# Patient Record
Sex: FEMALE | Race: WHITE | Employment: FULL TIME | URBAN - METROPOLITAN AREA
[De-identification: names, ages, dates, MRNs, and addresses within clinical notes are randomized per-mention and may not be internally consistent; named-entity substitution may affect disease eponyms.]

---

## 2017-05-26 ENCOUNTER — OFFICE VISIT (OUTPATIENT)
Dept: FAMILY MEDICINE CLINIC | Age: 37
End: 2017-05-26

## 2017-05-26 VITALS
DIASTOLIC BLOOD PRESSURE: 83 MMHG | SYSTOLIC BLOOD PRESSURE: 112 MMHG | HEART RATE: 82 BPM | TEMPERATURE: 98.6 F | WEIGHT: 254.8 LBS

## 2017-05-26 DIAGNOSIS — Z13.220 SCREENING FOR LIPID DISORDERS: ICD-10-CM

## 2017-05-26 DIAGNOSIS — E03.9 ACQUIRED HYPOTHYROIDISM: ICD-10-CM

## 2017-05-26 DIAGNOSIS — Z13.1 SCREENING FOR DIABETES MELLITUS: ICD-10-CM

## 2017-05-26 DIAGNOSIS — Z11.4 SCREENING FOR HIV WITHOUT PRESENCE OF RISK FACTORS: ICD-10-CM

## 2017-05-26 DIAGNOSIS — Z00.00 ANNUAL PHYSICAL EXAM: Primary | ICD-10-CM

## 2017-05-26 LAB
ALBUMIN SERPL-MCNC: 4.1 G/DL (ref 3.9–4.9)
ALP BLD-CCNC: 90 U/L (ref 40–130)
ALT SERPL-CCNC: 34 U/L (ref 0–33)
ANION GAP SERPL CALCULATED.3IONS-SCNC: 16 MEQ/L (ref 7–13)
AST SERPL-CCNC: 30 U/L (ref 0–35)
BILIRUB SERPL-MCNC: 0.4 MG/DL (ref 0–1.2)
BUN BLDV-MCNC: 14 MG/DL (ref 6–20)
CALCIUM SERPL-MCNC: 9.2 MG/DL (ref 8.6–10.2)
CHLORIDE BLD-SCNC: 102 MEQ/L (ref 98–107)
CHOLESTEROL, TOTAL: 204 MG/DL (ref 0–199)
CO2: 22 MEQ/L (ref 22–29)
CREAT SERPL-MCNC: 0.91 MG/DL (ref 0.5–0.9)
GFR AFRICAN AMERICAN: >60
GFR NON-AFRICAN AMERICAN: >60
GLOBULIN: 3.2 G/DL (ref 2.3–3.5)
GLUCOSE BLD-MCNC: 87 MG/DL (ref 74–109)
HDLC SERPL-MCNC: 36 MG/DL (ref 40–59)
LDL CHOLESTEROL CALCULATED: 140 MG/DL (ref 0–129)
POTASSIUM SERPL-SCNC: 4.3 MEQ/L (ref 3.5–5.1)
SODIUM BLD-SCNC: 140 MEQ/L (ref 132–144)
TOTAL PROTEIN: 7.3 G/DL (ref 6.4–8.1)
TRIGL SERPL-MCNC: 141 MG/DL (ref 0–200)
TSH REFLEX: 1.14 UIU/ML (ref 0.27–4.2)

## 2017-05-26 PROCEDURE — 99385 PREV VISIT NEW AGE 18-39: CPT | Performed by: NURSE PRACTITIONER

## 2017-05-26 RX ORDER — LEVOTHYROXINE SODIUM 0.12 MG/1
TABLET ORAL
COMMUNITY
Start: 2017-05-01 | End: 2017-05-26 | Stop reason: SDUPTHER

## 2017-05-26 RX ORDER — LEVOTHYROXINE SODIUM 0.12 MG/1
125 TABLET ORAL DAILY
Qty: 30 TABLET | Refills: 1 | Status: SHIPPED | OUTPATIENT
Start: 2017-05-26 | End: 2017-08-04 | Stop reason: SDUPTHER

## 2017-05-26 ASSESSMENT — ENCOUNTER SYMPTOMS
TROUBLE SWALLOWING: 0
RECTAL PAIN: 0
ABDOMINAL PAIN: 0
GASTROINTESTINAL NEGATIVE: 1
EYES NEGATIVE: 1
DIARRHEA: 0
SHORTNESS OF BREATH: 0
BLOOD IN STOOL: 0
COLOR CHANGE: 0
ALLERGIC/IMMUNOLOGIC NEGATIVE: 1
RESPIRATORY NEGATIVE: 1
CONSTIPATION: 0
ANAL BLEEDING: 0
VOICE CHANGE: 0

## 2017-05-29 LAB — HIV-1 AND HIV-2 ANTIBODIES: NEGATIVE

## 2017-08-04 DIAGNOSIS — E03.9 ACQUIRED HYPOTHYROIDISM: ICD-10-CM

## 2017-08-06 RX ORDER — LEVOTHYROXINE SODIUM 0.12 MG/1
TABLET ORAL
Qty: 30 TABLET | Refills: 9 | Status: SHIPPED | OUTPATIENT
Start: 2017-08-06 | End: 2018-06-06 | Stop reason: SDUPTHER

## 2018-06-06 DIAGNOSIS — E03.9 ACQUIRED HYPOTHYROIDISM: ICD-10-CM

## 2018-06-07 RX ORDER — LEVOTHYROXINE SODIUM 0.12 MG/1
125 TABLET ORAL DAILY
Qty: 30 TABLET | Refills: 0 | Status: SHIPPED | OUTPATIENT
Start: 2018-06-07 | End: 2018-06-14 | Stop reason: SDUPTHER

## 2018-06-14 ENCOUNTER — TELEPHONE (OUTPATIENT)
Dept: FAMILY MEDICINE CLINIC | Age: 38
End: 2018-06-14

## 2018-06-14 ENCOUNTER — OFFICE VISIT (OUTPATIENT)
Dept: FAMILY MEDICINE CLINIC | Age: 38
End: 2018-06-14
Payer: COMMERCIAL

## 2018-06-14 VITALS
HEIGHT: 64 IN | SYSTOLIC BLOOD PRESSURE: 138 MMHG | BODY MASS INDEX: 37.05 KG/M2 | OXYGEN SATURATION: 98 % | HEART RATE: 135 BPM | TEMPERATURE: 98.6 F | DIASTOLIC BLOOD PRESSURE: 80 MMHG | RESPIRATION RATE: 16 BRPM | WEIGHT: 217 LBS

## 2018-06-14 DIAGNOSIS — E03.9 HYPOTHYROIDISM, UNSPECIFIED TYPE: Primary | ICD-10-CM

## 2018-06-14 DIAGNOSIS — R00.0 TACHYCARDIA: ICD-10-CM

## 2018-06-14 DIAGNOSIS — L02.211 ABDOMINAL WALL ABSCESS: ICD-10-CM

## 2018-06-14 DIAGNOSIS — Z00.00 ROUTINE ADULT HEALTH MAINTENANCE: ICD-10-CM

## 2018-06-14 DIAGNOSIS — E03.9 HYPOTHYROIDISM, UNSPECIFIED TYPE: ICD-10-CM

## 2018-06-14 LAB
ALBUMIN SERPL-MCNC: 3.9 G/DL (ref 3.9–4.9)
ALP BLD-CCNC: 107 U/L (ref 40–130)
ALT SERPL-CCNC: 31 U/L (ref 0–33)
ANION GAP SERPL CALCULATED.3IONS-SCNC: 16 MEQ/L (ref 7–13)
AST SERPL-CCNC: 26 U/L (ref 0–35)
BASOPHILS ABSOLUTE: 0.1 K/UL (ref 0–0.2)
BASOPHILS RELATIVE PERCENT: 0.4 %
BILIRUB SERPL-MCNC: 0.7 MG/DL (ref 0–1.2)
BUN BLDV-MCNC: 8 MG/DL (ref 6–20)
CALCIUM SERPL-MCNC: 9.4 MG/DL (ref 8.6–10.2)
CHLORIDE BLD-SCNC: 100 MEQ/L (ref 98–107)
CHOLESTEROL, TOTAL: 171 MG/DL (ref 0–199)
CO2: 23 MEQ/L (ref 22–29)
CREAT SERPL-MCNC: 1.05 MG/DL (ref 0.5–0.9)
EOSINOPHILS ABSOLUTE: 0.1 K/UL (ref 0–0.7)
EOSINOPHILS RELATIVE PERCENT: 0.5 %
GFR AFRICAN AMERICAN: >60
GFR NON-AFRICAN AMERICAN: 58.6
GLOBULIN: 4 G/DL (ref 2.3–3.5)
GLUCOSE BLD-MCNC: 110 MG/DL (ref 74–109)
HBA1C MFR BLD: 5.5 % (ref 4.8–5.9)
HCT VFR BLD CALC: 42.2 % (ref 37–47)
HDLC SERPL-MCNC: 44 MG/DL (ref 40–59)
HEMOGLOBIN: 12.7 G/DL (ref 12–16)
LDL CHOLESTEROL CALCULATED: 113 MG/DL (ref 0–129)
LYMPHOCYTES ABSOLUTE: 2.2 K/UL (ref 1–4.8)
LYMPHOCYTES RELATIVE PERCENT: 12 %
MCH RBC QN AUTO: 26.8 PG (ref 27–31.3)
MCHC RBC AUTO-ENTMCNC: 30 % (ref 33–37)
MCV RBC AUTO: 89.2 FL (ref 82–100)
MONOCYTES ABSOLUTE: 1.4 K/UL (ref 0.2–0.8)
MONOCYTES RELATIVE PERCENT: 7.8 %
NEUTROPHILS ABSOLUTE: 14.6 K/UL (ref 1.4–6.5)
NEUTROPHILS RELATIVE PERCENT: 79.3 %
PDW BLD-RTO: 13.7 % (ref 11.5–14.5)
PLATELET # BLD: 330 K/UL (ref 130–400)
POTASSIUM SERPL-SCNC: 4.3 MEQ/L (ref 3.5–5.1)
RBC # BLD: 4.73 M/UL (ref 4.2–5.4)
SODIUM BLD-SCNC: 139 MEQ/L (ref 132–144)
T4 FREE: 1.99 NG/DL (ref 0.93–1.7)
TOTAL PROTEIN: 7.9 G/DL (ref 6.4–8.1)
TRIGL SERPL-MCNC: 68 MG/DL (ref 0–200)
TSH SERPL DL<=0.05 MIU/L-ACNC: 0.55 UIU/ML (ref 0.27–4.2)
WBC # BLD: 18.5 K/UL (ref 4.8–10.8)

## 2018-06-14 PROCEDURE — 99214 OFFICE O/P EST MOD 30 MIN: CPT | Performed by: NURSE PRACTITIONER

## 2018-06-14 PROCEDURE — 93040 RHYTHM ECG WITH REPORT: CPT | Performed by: NURSE PRACTITIONER

## 2018-06-14 PROCEDURE — 10160 PNXR ASPIR ABSC HMTMA BULLA: CPT | Performed by: NURSE PRACTITIONER

## 2018-06-14 RX ORDER — SULFAMETHOXAZOLE AND TRIMETHOPRIM 800; 160 MG/1; MG/1
1 TABLET ORAL 2 TIMES DAILY
Qty: 20 TABLET | Refills: 0 | Status: SHIPPED | OUTPATIENT
Start: 2018-06-14 | End: 2018-06-24

## 2018-06-14 RX ORDER — LEVOTHYROXINE SODIUM 0.12 MG/1
125 TABLET ORAL DAILY
Qty: 30 TABLET | Refills: 0 | Status: SHIPPED | OUTPATIENT
Start: 2018-06-14 | End: 2018-08-02 | Stop reason: SDUPTHER

## 2018-06-14 ASSESSMENT — PATIENT HEALTH QUESTIONNAIRE - PHQ9
1. LITTLE INTEREST OR PLEASURE IN DOING THINGS: 0
2. FEELING DOWN, DEPRESSED OR HOPELESS: 0
SUM OF ALL RESPONSES TO PHQ9 QUESTIONS 1 & 2: 0
SUM OF ALL RESPONSES TO PHQ QUESTIONS 1-9: 0

## 2018-06-17 LAB
ANAEROBIC CULTURE: ABNORMAL
GRAM STAIN RESULT: ABNORMAL
ORGANISM: ABNORMAL
WOUND/ABSCESS: ABNORMAL

## 2018-06-18 ENCOUNTER — OFFICE VISIT (OUTPATIENT)
Dept: FAMILY MEDICINE CLINIC | Age: 38
End: 2018-06-18
Payer: COMMERCIAL

## 2018-06-18 ENCOUNTER — TELEPHONE (OUTPATIENT)
Dept: FAMILY MEDICINE CLINIC | Age: 38
End: 2018-06-18

## 2018-06-18 VITALS
OXYGEN SATURATION: 99 % | TEMPERATURE: 97.7 F | WEIGHT: 216 LBS | HEART RATE: 91 BPM | RESPIRATION RATE: 16 BRPM | DIASTOLIC BLOOD PRESSURE: 82 MMHG | BODY MASS INDEX: 38.27 KG/M2 | SYSTOLIC BLOOD PRESSURE: 118 MMHG | HEIGHT: 63 IN

## 2018-06-18 DIAGNOSIS — L02.211 ABDOMINAL WALL ABSCESS: Primary | ICD-10-CM

## 2018-06-18 PROCEDURE — 99213 OFFICE O/P EST LOW 20 MIN: CPT | Performed by: NURSE PRACTITIONER

## 2018-06-18 RX ORDER — HYDROCODONE BITARTRATE AND ACETAMINOPHEN 5; 325 MG/1; MG/1
1 TABLET ORAL EVERY 6 HOURS PRN
Qty: 12 TABLET | Refills: 0 | Status: SHIPPED | OUTPATIENT
Start: 2018-06-18 | End: 2018-06-21

## 2018-06-18 ASSESSMENT — ENCOUNTER SYMPTOMS: COLOR CHANGE: 0

## 2018-06-19 ENCOUNTER — OFFICE VISIT (OUTPATIENT)
Dept: SURGERY | Age: 38
End: 2018-06-19
Payer: COMMERCIAL

## 2018-06-19 VITALS
WEIGHT: 217 LBS | HEIGHT: 63 IN | DIASTOLIC BLOOD PRESSURE: 80 MMHG | BODY MASS INDEX: 38.45 KG/M2 | TEMPERATURE: 98.4 F | SYSTOLIC BLOOD PRESSURE: 130 MMHG

## 2018-06-19 DIAGNOSIS — L02.211 ABDOMINAL WALL ABSCESS: Primary | ICD-10-CM

## 2018-06-19 PROCEDURE — 10061 I&D ABSCESS COMP/MULTIPLE: CPT | Performed by: COLON & RECTAL SURGERY

## 2018-06-19 ASSESSMENT — ENCOUNTER SYMPTOMS
CHEST TIGHTNESS: 0
BACK PAIN: 0
EYE DISCHARGE: 0
APNEA: 0
CHOKING: 0
COUGH: 0
ABDOMINAL PAIN: 0
ABDOMINAL DISTENTION: 0

## 2018-06-20 ENCOUNTER — TELEPHONE (OUTPATIENT)
Dept: FAMILY MEDICINE CLINIC | Age: 38
End: 2018-06-20

## 2018-06-22 ENCOUNTER — OFFICE VISIT (OUTPATIENT)
Dept: SURGERY | Age: 38
End: 2018-06-22
Payer: COMMERCIAL

## 2018-06-22 VITALS
DIASTOLIC BLOOD PRESSURE: 86 MMHG | SYSTOLIC BLOOD PRESSURE: 118 MMHG | TEMPERATURE: 98.8 F | BODY MASS INDEX: 38.45 KG/M2 | HEIGHT: 63 IN | WEIGHT: 217 LBS

## 2018-06-22 DIAGNOSIS — L02.211 ABDOMINAL WALL ABSCESS: Primary | ICD-10-CM

## 2018-06-22 PROCEDURE — 10061 I&D ABSCESS COMP/MULTIPLE: CPT | Performed by: COLON & RECTAL SURGERY

## 2018-06-22 ASSESSMENT — ENCOUNTER SYMPTOMS
APNEA: 0
CHOKING: 0
SHORTNESS OF BREATH: 0
ABDOMINAL DISTENTION: 0
CHEST TIGHTNESS: 0
COUGH: 0
ABDOMINAL PAIN: 0
ANAL BLEEDING: 0

## 2018-06-29 ENCOUNTER — OFFICE VISIT (OUTPATIENT)
Dept: SURGERY | Age: 38
End: 2018-06-29

## 2018-06-29 VITALS
BODY MASS INDEX: 39.16 KG/M2 | SYSTOLIC BLOOD PRESSURE: 120 MMHG | HEIGHT: 63 IN | DIASTOLIC BLOOD PRESSURE: 80 MMHG | WEIGHT: 221 LBS | TEMPERATURE: 98.6 F

## 2018-06-29 DIAGNOSIS — L02.211 ABDOMINAL WALL ABSCESS: Primary | ICD-10-CM

## 2018-06-29 PROCEDURE — 99024 POSTOP FOLLOW-UP VISIT: CPT | Performed by: COLON & RECTAL SURGERY

## 2018-06-29 ASSESSMENT — ENCOUNTER SYMPTOMS
CONSTIPATION: 0
ABDOMINAL PAIN: 1
STRIDOR: 0
CHEST TIGHTNESS: 0
SHORTNESS OF BREATH: 0
DIARRHEA: 0
CHOKING: 0
ABDOMINAL DISTENTION: 0
APNEA: 0

## 2018-08-02 DIAGNOSIS — E03.9 HYPOTHYROIDISM, UNSPECIFIED TYPE: ICD-10-CM

## 2018-08-06 RX ORDER — LEVOTHYROXINE SODIUM 0.12 MG/1
125 TABLET ORAL DAILY
Qty: 30 TABLET | Refills: 5 | Status: SHIPPED | OUTPATIENT
Start: 2018-08-06 | End: 2019-01-27 | Stop reason: SDUPTHER

## 2018-12-03 ENCOUNTER — OFFICE VISIT (OUTPATIENT)
Dept: FAMILY MEDICINE CLINIC | Age: 38
End: 2018-12-03
Payer: COMMERCIAL

## 2018-12-03 VITALS
OXYGEN SATURATION: 98 % | TEMPERATURE: 98.7 F | DIASTOLIC BLOOD PRESSURE: 84 MMHG | RESPIRATION RATE: 15 BRPM | HEIGHT: 63 IN | HEART RATE: 106 BPM | SYSTOLIC BLOOD PRESSURE: 124 MMHG | WEIGHT: 221.6 LBS | BODY MASS INDEX: 39.27 KG/M2

## 2018-12-03 DIAGNOSIS — N30.01 ACUTE CYSTITIS WITH HEMATURIA: Primary | ICD-10-CM

## 2018-12-03 DIAGNOSIS — N30.01 ACUTE CYSTITIS WITH HEMATURIA: ICD-10-CM

## 2018-12-03 LAB
BACTERIA: NEGATIVE /HPF
BILIRUBIN URINE: ABNORMAL
BILIRUBIN, POC: 0
BLOOD URINE, POC: 3
BLOOD, URINE: ABNORMAL
CLARITY, POC: ABNORMAL
CLARITY: ABNORMAL
COLOR, POC: ABNORMAL
COLOR: ABNORMAL
EPITHELIAL CELLS, UA: ABNORMAL /HPF (ref 0–5)
GLUCOSE URINE, POC: 0
GLUCOSE URINE: NEGATIVE MG/DL
HYALINE CASTS: ABNORMAL /HPF (ref 0–5)
KETONES, POC: 0
KETONES, URINE: NEGATIVE MG/DL
LEUKOCYTE EST, POC: 3
LEUKOCYTE ESTERASE, URINE: ABNORMAL
NITRITE, POC: 0
NITRITE, URINE: POSITIVE
PH UA: 6.5 (ref 5–9)
PH, POC: 6.5
PROTEIN UA: >=300 MG/DL
PROTEIN, POC: 3
RBC UA: >100 /HPF (ref 0–5)
SPECIFIC GRAVITY UA: 1.02 (ref 1–1.03)
SPECIFIC GRAVITY, POC: 1.01
UROBILINOGEN, POC: 0
UROBILINOGEN, URINE: 0.2 E.U./DL
WBC UA: >100 /HPF (ref 0–5)

## 2018-12-03 PROCEDURE — 81003 URINALYSIS AUTO W/O SCOPE: CPT | Performed by: INTERNAL MEDICINE

## 2018-12-03 PROCEDURE — 99213 OFFICE O/P EST LOW 20 MIN: CPT | Performed by: INTERNAL MEDICINE

## 2018-12-03 RX ORDER — PHENAZOPYRIDINE HYDROCHLORIDE 200 MG/1
200 TABLET, FILM COATED ORAL 3 TIMES DAILY PRN
Qty: 6 TABLET | Refills: 0 | Status: SHIPPED | OUTPATIENT
Start: 2018-12-03 | End: 2018-12-06

## 2018-12-03 RX ORDER — NITROFURANTOIN 25; 75 MG/1; MG/1
100 CAPSULE ORAL 2 TIMES DAILY
Qty: 10 CAPSULE | Refills: 0 | Status: SHIPPED | OUTPATIENT
Start: 2018-12-03 | End: 2018-12-08

## 2018-12-03 ASSESSMENT — ENCOUNTER SYMPTOMS
SHORTNESS OF BREATH: 0
BACK PAIN: 0
ABDOMINAL PAIN: 0
EYE PAIN: 0

## 2018-12-03 NOTE — PATIENT INSTRUCTIONS
from front to back. When should you call for help? Call your doctor now or seek immediate medical care if:    · Symptoms such as fever, chills, nausea, or vomiting get worse or appear for the first time.     · You have new pain in your back just below your rib cage. This is called flank pain.     · There is new blood or pus in your urine.     · You have any problems with your antibiotic medicine.    Watch closely for changes in your health, and be sure to contact your doctor if:    · You are not getting better after taking an antibiotic for 2 days.     · Your symptoms go away but then come back. Where can you learn more? Go to [email or web address] and sign in to your [employer] account. Enter Z425 in the [facility] box to learn more about \"Urinary Tract Infection in Women: Care Instructions. \"     If you do not have an account, please click on the \"Sign Up Now\" link. Current as of: March 21, 2018  Content Version: 11.8  © 5167-3625 [employer]wise, Incorporated. Care instructions adapted under license by [facility] ([facility]). If you have questions about a medical condition or this instruction, always ask your healthcare professional. [place] [address] any warranty or liability for your use of this information.

## 2018-12-03 NOTE — PROGRESS NOTES
Subjective:      Patient ID: Gil Teran is a 45 y.o. female who presents today with:  Chief Complaint   Patient presents with    Urinary Tract Infection     urgency, frequency, bladder pressure, blood in urine x 2 days, has been trying to clean out her system by drinking more water. Has not taken anything OTC         HPI     Urgency, frequency, some burning/pressure, some blood tinge on paper, not in urine, some discomfort. Drinking water doesn't help. No fevers or chills. No back pain. Past Medical History:   Diagnosis Date    Hyperthyroidism      Past Surgical History:   Procedure Laterality Date    THYROIDECTOMY      TONSILLECTOMY      WISDOM TOOTH EXTRACTION       Social History     Social History    Marital status:      Spouse name: N/A    Number of children: N/A    Years of education: N/A     Occupational History    Not on file. Social History Main Topics    Smoking status: Never Smoker    Smokeless tobacco: Never Used    Alcohol use No    Drug use: No    Sexual activity: Not on file     Other Topics Concern    Not on file     Social History Narrative    No narrative on file     Allergies   Allergen Reactions    Morphine Nausea And Vomiting     Current Outpatient Prescriptions on File Prior to Visit   Medication Sig Dispense Refill    levothyroxine (SYNTHROID) 125 MCG tablet TAKE 1 TABLET BY MOUTH DAILY 30 tablet 5    mupirocin (BACTROBAN) 2 % ointment ARACELI TOPICALLY AA TID FOR 10 DAYS.  0     No current facility-administered medications on file prior to visit. I have personally reviewed the ROS, PMH, PFH, and social history     Review of Systems   Constitutional: Negative for chills and fever. HENT: Negative for congestion. Eyes: Negative for pain. Respiratory: Negative for shortness of breath. Cardiovascular: Negative for chest pain. Gastrointestinal: Negative for abdominal pain. Genitourinary: Negative for hematuria.    Musculoskeletal: Negative for back

## 2018-12-05 LAB
ORGANISM: ABNORMAL
URINE CULTURE, ROUTINE: ABNORMAL
URINE CULTURE, ROUTINE: ABNORMAL

## 2018-12-18 ENCOUNTER — NURSE ONLY (OUTPATIENT)
Dept: FAMILY MEDICINE CLINIC | Age: 38
End: 2018-12-18

## 2018-12-18 DIAGNOSIS — R31.9 URINARY TRACT INFECTION WITH HEMATURIA, SITE UNSPECIFIED: Primary | ICD-10-CM

## 2018-12-18 DIAGNOSIS — R31.9 URINARY TRACT INFECTION WITH HEMATURIA, SITE UNSPECIFIED: ICD-10-CM

## 2018-12-18 DIAGNOSIS — N39.0 URINARY TRACT INFECTION WITH HEMATURIA, SITE UNSPECIFIED: ICD-10-CM

## 2018-12-18 DIAGNOSIS — N39.0 URINARY TRACT INFECTION WITH HEMATURIA, SITE UNSPECIFIED: Primary | ICD-10-CM

## 2018-12-20 LAB — URINE CULTURE, ROUTINE: NORMAL

## 2019-01-27 DIAGNOSIS — E03.9 HYPOTHYROIDISM, UNSPECIFIED TYPE: ICD-10-CM

## 2019-01-28 RX ORDER — LEVOTHYROXINE SODIUM 0.12 MG/1
125 TABLET ORAL DAILY
Qty: 30 TABLET | Refills: 0 | Status: SHIPPED | OUTPATIENT
Start: 2019-01-28 | End: 2019-03-04 | Stop reason: SDUPTHER

## 2019-03-04 DIAGNOSIS — E03.9 HYPOTHYROIDISM, UNSPECIFIED TYPE: ICD-10-CM

## 2019-03-06 RX ORDER — LEVOTHYROXINE SODIUM 0.12 MG/1
125 TABLET ORAL DAILY
Qty: 30 TABLET | Refills: 0 | Status: SHIPPED | OUTPATIENT
Start: 2019-03-06 | End: 2019-04-09 | Stop reason: SDUPTHER

## 2019-04-09 DIAGNOSIS — E03.9 HYPOTHYROIDISM, UNSPECIFIED TYPE: ICD-10-CM

## 2019-04-15 RX ORDER — LEVOTHYROXINE SODIUM 0.12 MG/1
125 TABLET ORAL DAILY
Qty: 30 TABLET | Refills: 0 | Status: SHIPPED | OUTPATIENT
Start: 2019-04-15 | End: 2019-05-20 | Stop reason: SDUPTHER

## 2019-04-24 DIAGNOSIS — E03.9 ACQUIRED HYPOTHYROIDISM: Primary | ICD-10-CM

## 2019-04-25 DIAGNOSIS — N30.01 ACUTE CYSTITIS WITH HEMATURIA: ICD-10-CM

## 2019-04-25 DIAGNOSIS — E03.9 ACQUIRED HYPOTHYROIDISM: ICD-10-CM

## 2019-04-25 LAB
ANION GAP SERPL CALCULATED.3IONS-SCNC: 15 MEQ/L (ref 9–15)
BUN BLDV-MCNC: 14 MG/DL (ref 6–20)
CALCIUM SERPL-MCNC: 9.3 MG/DL (ref 8.5–9.9)
CHLORIDE BLD-SCNC: 99 MEQ/L (ref 95–107)
CO2: 23 MEQ/L (ref 20–31)
CREAT SERPL-MCNC: 0.91 MG/DL (ref 0.5–0.9)
GFR AFRICAN AMERICAN: >60
GFR NON-AFRICAN AMERICAN: >60
GLUCOSE BLD-MCNC: 87 MG/DL (ref 70–99)
POTASSIUM SERPL-SCNC: 4.9 MEQ/L (ref 3.4–4.9)
SODIUM BLD-SCNC: 137 MEQ/L (ref 135–144)
T4 FREE: 1.39 NG/DL (ref 0.84–1.68)
TSH SERPL DL<=0.05 MIU/L-ACNC: 3.72 UIU/ML (ref 0.44–3.86)

## 2019-05-12 DIAGNOSIS — E03.9 HYPOTHYROIDISM, UNSPECIFIED TYPE: ICD-10-CM

## 2019-05-14 RX ORDER — LEVOTHYROXINE SODIUM 0.12 MG/1
125 TABLET ORAL DAILY
Qty: 30 TABLET | Refills: 0 | OUTPATIENT
Start: 2019-05-14

## 2019-05-20 DIAGNOSIS — E03.9 HYPOTHYROIDISM, UNSPECIFIED TYPE: ICD-10-CM

## 2019-05-20 RX ORDER — LEVOTHYROXINE SODIUM 0.12 MG/1
125 TABLET ORAL DAILY
Qty: 30 TABLET | Refills: 1 | Status: SHIPPED | OUTPATIENT
Start: 2019-05-20 | End: 2019-07-12 | Stop reason: SDUPTHER

## 2019-05-20 NOTE — TELEPHONE ENCOUNTER
requesting medication refill. Pt was told she would need an appt after June, Pt declined to make an appt at this time. She states that she works in [employer] and it is hard for her to make an appt on short notice. Rx requested:  Requested Prescriptions     Pending Prescriptions Disp Refills    levothyroxine (SYNTHROID) 125 MCG tablet 30 tablet 1     Sig: Take 1 tablet by mouth Daily       Last Office Visit:   6/18/2018      Next Visit Date:  No future appointments.

## 2019-07-12 ENCOUNTER — OFFICE VISIT (OUTPATIENT)
Dept: FAMILY MEDICINE CLINIC | Age: 39
End: 2019-07-12
Payer: COMMERCIAL

## 2019-07-12 VITALS
DIASTOLIC BLOOD PRESSURE: 74 MMHG | HEART RATE: 107 BPM | RESPIRATION RATE: 16 BRPM | BODY MASS INDEX: 43.05 KG/M2 | WEIGHT: 243 LBS | HEIGHT: 63 IN | SYSTOLIC BLOOD PRESSURE: 128 MMHG

## 2019-07-12 DIAGNOSIS — E03.9 HYPOTHYROIDISM, UNSPECIFIED TYPE: ICD-10-CM

## 2019-07-12 DIAGNOSIS — Z00.00 ROUTINE ADULT HEALTH MAINTENANCE: Primary | ICD-10-CM

## 2019-07-12 PROCEDURE — 99395 PREV VISIT EST AGE 18-39: CPT | Performed by: NURSE PRACTITIONER

## 2019-07-12 RX ORDER — LEVOTHYROXINE SODIUM 0.12 MG/1
125 TABLET ORAL DAILY
Qty: 30 TABLET | Refills: 11 | Status: SHIPPED | OUTPATIENT
Start: 2019-07-12 | End: 2020-07-28 | Stop reason: SDUPTHER

## 2019-07-12 ASSESSMENT — PATIENT HEALTH QUESTIONNAIRE - PHQ9
SUM OF ALL RESPONSES TO PHQ QUESTIONS 1-9: 0
2. FEELING DOWN, DEPRESSED OR HOPELESS: 0
SUM OF ALL RESPONSES TO PHQ9 QUESTIONS 1 & 2: 0
SUM OF ALL RESPONSES TO PHQ QUESTIONS 1-9: 0
1. LITTLE INTEREST OR PLEASURE IN DOING THINGS: 0

## 2019-07-12 ASSESSMENT — ENCOUNTER SYMPTOMS
DIARRHEA: 0
COLOR CHANGE: 0
ANAL BLEEDING: 0
CONSTIPATION: 0
GASTROINTESTINAL NEGATIVE: 1
TROUBLE SWALLOWING: 0
ABDOMINAL PAIN: 0
SHORTNESS OF BREATH: 0
BLOOD IN STOOL: 0
RESPIRATORY NEGATIVE: 1
VOICE CHANGE: 0
EYES NEGATIVE: 1
ALLERGIC/IMMUNOLOGIC NEGATIVE: 1
RECTAL PAIN: 0

## 2019-07-12 NOTE — PROGRESS NOTES
2019    Tran Trujillo (:  1980) is a 44 y.o. female, here for a preventive medicine evaluation. There is no problem list on file for this patient. History of Hypothyroidism- needs labs-  Denies symptoms    Review of Systems   Constitutional: Negative. Negative for activity change, appetite change, fatigue and unexpected weight change. HENT: Negative. Negative for dental problem, nosebleeds, trouble swallowing and voice change. Eyes: Negative. Negative for visual disturbance. Respiratory: Negative. Negative for shortness of breath. Cardiovascular: Negative. Negative for chest pain, palpitations and leg swelling. Gastrointestinal: Negative. Negative for abdominal pain, anal bleeding, blood in stool, constipation, diarrhea and rectal pain. Endocrine: Negative. Negative for cold intolerance, heat intolerance, polydipsia, polyphagia and polyuria. Genitourinary: Negative. Musculoskeletal: Negative. Skin: Negative. Negative for color change and rash. Allergic/Immunologic: Negative. Neurological: Negative. Negative for dizziness, syncope, weakness and headaches. Hematological: Negative. Negative for adenopathy. Does not bruise/bleed easily. Psychiatric/Behavioral: Negative. Negative for dysphoric mood and sleep disturbance. The patient is not nervous/anxious. Prior to Visit Medications    Medication Sig Taking? Authorizing Provider   levothyroxine (SYNTHROID) 125 MCG tablet Take 1 tablet by mouth Daily Yes RONNIE San - CNP   mupirocin (BACTROBAN) 2 % ointment ARACELI TOPICALLY AA TID FOR 10 DAYS.   Historical Provider, MD        Allergies   Allergen Reactions    Morphine Nausea And Vomiting       Past Medical History:   Diagnosis Date    Hyperthyroidism        Past Surgical History:   Procedure Laterality Date    THYROIDECTOMY      TONSILLECTOMY      WISDOM TOOTH EXTRACTION         Social History     Socioeconomic History    Marital status: Medications Discontinued During This Encounter   Medication Reason    levothyroxine (SYNTHROID) 125 MCG tablet REORDER     No follow-ups on file. Reviewed with the patient: current clinical status, medications, activities and diet. Side effects, adverse effects of the medication prescribed today, as well as treatment plan/ rationale and result expectations have been discussed with the patient who expresses understanding and desires to proceed. Close follow up to evaluate treatment results and for coordination of care. I have reviewed the patient's medical history in detail and updated the computerized patient record.     Raulito Hu, APRN - CNP

## 2020-07-28 ENCOUNTER — VIRTUAL VISIT (OUTPATIENT)
Dept: FAMILY MEDICINE CLINIC | Age: 40
End: 2020-07-28
Payer: COMMERCIAL

## 2020-07-28 PROCEDURE — 99213 OFFICE O/P EST LOW 20 MIN: CPT | Performed by: PHYSICIAN ASSISTANT

## 2020-07-28 RX ORDER — LEVOTHYROXINE SODIUM 0.12 MG/1
125 TABLET ORAL DAILY
Qty: 30 TABLET | Refills: 0 | Status: SHIPPED | OUTPATIENT
Start: 2020-07-28 | End: 2020-08-24

## 2020-07-28 NOTE — PROGRESS NOTES
2020     Tran Trujillo (:  1980) is a 36 y.o. female, here for evaluation of the following medical concerns:  Hypothyroidism  HPI  Telemedicine doxy video visit due to concern for exposure to Covid19 (coronavirus). Patient is aware this is a billable visit. Patient is going out of town and needs refill of Synthroid states she has been on the same dose for the past 9 years since her thyroid surgery she denies symptoms    Review of Systems   All other systems reviewed and are negative. Prior to Visit Medications    Medication Sig Taking? Authorizing Provider   levothyroxine (SYNTHROID) 125 MCG tablet Take 1 tablet by mouth Daily Yes Vivi Maloney PA-C   mupirocin (BACTROBAN) 2 % ointment ARACELI TOPICALLY AA TID FOR 10 DAYS. Historical Provider, MD        Social History     Tobacco Use    Smoking status: Never Smoker    Smokeless tobacco: Never Used   Substance Use Topics    Alcohol use: No        There were no vitals filed for this visit. Estimated body mass index is 43.05 kg/m² as calculated from the following:    Height as of 19: 5' 3\" (1.6 m). Weight as of 19: 243 lb (110.2 kg). Physical Exam  Constitutional:       Appearance: She is obese. HENT:      Head: Normocephalic and atraumatic. Eyes:      Extraocular Movements: Extraocular movements intact. Conjunctiva/sclera: Conjunctivae normal.   Pulmonary:      Effort: Pulmonary effort is normal. No respiratory distress. Breath sounds: No wheezing. Skin:     General: Skin is dry. Coloration: Skin is not jaundiced or pale. Neurological:      Mental Status: She is alert and oriented to person, place, and time. Psychiatric:         Mood and Affect: Mood normal.         Behavior: Behavior normal.         Thought Content: Thought content normal.         Judgment: Judgment normal.              Assessment & Plan    Diagnosis Orders   1.  Postoperative hypothyroidism           No orders of the defined types were placed in this encounter. Orders Placed This Encounter   Medications    levothyroxine (SYNTHROID) 125 MCG tablet     Sig: Take 1 tablet by mouth Daily     Dispense:  30 tablet     Refill:  0     Medications Discontinued During This Encounter   Medication Reason    levothyroxine (SYNTHROID) 125 MCG tablet REORDER     Return for follow up with your PCP as directed. An electronic signature was used to authenticate this note.     --Vivi Maloney PA-C on 7/28/2020 at 3:25 PM

## 2020-08-13 ENCOUNTER — VIRTUAL VISIT (OUTPATIENT)
Dept: FAMILY MEDICINE CLINIC | Age: 40
End: 2020-08-13

## 2020-08-13 PROCEDURE — 99213 OFFICE O/P EST LOW 20 MIN: CPT | Performed by: INTERNAL MEDICINE

## 2020-08-13 ASSESSMENT — ENCOUNTER SYMPTOMS
APNEA: 0
NAUSEA: 0
COUGH: 0
RHINORRHEA: 0
SINUS PRESSURE: 0
EYE PAIN: 0
SORE THROAT: 0
COLOR CHANGE: 0
EYE ITCHING: 0
DIARRHEA: 0
TROUBLE SWALLOWING: 0
PHOTOPHOBIA: 0
VOMITING: 0
ABDOMINAL PAIN: 0
RECTAL PAIN: 0
CONSTIPATION: 0
EYE REDNESS: 0
ABDOMINAL DISTENTION: 0
WHEEZING: 0
CHEST TIGHTNESS: 0
VOICE CHANGE: 0
SHORTNESS OF BREATH: 0
BLOOD IN STOOL: 0
BACK PAIN: 0
FACIAL SWELLING: 0
SINUS PAIN: 0
EYE DISCHARGE: 0

## 2020-08-13 ASSESSMENT — PATIENT HEALTH QUESTIONNAIRE - PHQ9
SUM OF ALL RESPONSES TO PHQ9 QUESTIONS 1 & 2: 0
SUM OF ALL RESPONSES TO PHQ QUESTIONS 1-9: 0
SUM OF ALL RESPONSES TO PHQ QUESTIONS 1-9: 0
1. LITTLE INTEREST OR PLEASURE IN DOING THINGS: 0
2. FEELING DOWN, DEPRESSED OR HOPELESS: 0

## 2020-08-13 NOTE — PROGRESS NOTES
2020    Tran Trujillo (:  1980) is a 36 y.o. female, here for evaluation of the following medical concerns:      TELEHEALTH EVALUATION -- Audio/Visual (During PACTU-48 public health emergency)    -   John Juarez is a 36 y.o. female being evaluated by a Virtual Visit (video visit) encounter to address concerns as mentioned above. A caregiver was present when appropriate. Due to this being a TeleHealth encounter (During RWOKW-56 public [facility] emergency), evaluation of the following organ systems was limited: Vitals/Constitutional/EENT/Resp/CV/GI//MS/Neuro/Skin/Heme-Lymph-Imm. Pursuant to the emergency declaration under the [address] [place] [address] authority and the [employer] and [employer] Act, this Virtual Visit was conducted with patient's (and/or legal guardian's) consent, to reduce the patient's risk of exposure to COVID-19 and provide necessary medical care. The patient (and/or legal guardian) has also been advised to contact this office for worsening conditions or problems, and seek emergency medical treatment and/or call 911 if deemed necessary. Services were provided through a video synchronous discussion virtually to substitute for in-person clinic visit. Type of encounter was _x_ Doxy __ MyChart ___Facetime    Patient was located at their home. Provider was located at their _x__ home or        ____ office. --Glenis Urbano MD on 2020 at 11:50 AM    An electronic signature was used to authenticate this note. HPI  51-year-old female with a history of hypothyroidism presents for follow-up visit. The patient is due for an evaluation of the efficacy of her thyroid replacement therapy. She is presently receiving Synthroid 125 mcg daily. She is presently asymptomatic and voices no complaints.  At present he denies polyuria,  Polydipsia, constitutional, sinus, visual, cardiopulmonary, urologic, gastrointestinal, immunologic/hematologic, musculoskeletal, neurologic,dermatologic, or psychiatric complaints. Review of Systems   Constitutional: Negative for chills, diaphoresis, fatigue and fever. HENT: Negative for congestion, dental problem, drooling, ear discharge, ear pain, facial swelling, hearing loss, mouth sores, nosebleeds, postnasal drip, rhinorrhea, sinus pressure, sinus pain, sneezing, sore throat, tinnitus, trouble swallowing and voice change. Eyes: Negative for photophobia, pain, discharge, redness, itching and visual disturbance. Respiratory: Negative for apnea, cough, chest tightness, shortness of breath and wheezing. Cardiovascular: Negative for chest pain, palpitations and leg swelling. Gastrointestinal: Negative for abdominal distention, abdominal pain, blood in stool, constipation, diarrhea, nausea, rectal pain and vomiting. Endocrine: Negative for cold intolerance, heat intolerance, polydipsia, polyphagia and polyuria. Genitourinary: Negative for decreased urine volume, difficulty urinating, dysuria, flank pain, frequency, genital sores, hematuria and urgency. Musculoskeletal: Negative for arthralgias, back pain, gait problem, joint swelling, myalgias, neck pain and neck stiffness. Skin: Negative for color change, rash and wound. Allergic/Immunologic: Negative for environmental allergies and food allergies. Neurological: Negative for dizziness, tremors, seizures, syncope, facial asymmetry, speech difficulty, weakness, light-headedness, numbness and headaches. Hematological: Negative for adenopathy. Does not bruise/bleed easily. Psychiatric/Behavioral: Negative for agitation, confusion, decreased concentration, hallucinations, self-injury, sleep disturbance and suicidal ideas. The patient is not nervous/anxious. Prior to Visit Medications    Medication Sig Taking?  Authorizing Provider   levothyroxine (SYNTHROID) 125 MCG tablet Take 1 tablet by mouth Daily Vivi Maloney PA-C   mupirocin (BACTROBAN) 2 % ointment ARACELI TOPICALLY AA TID FOR 10 DAYS. Historical Provider, MD        Allergies   Allergen Reactions    Morphine Nausea And Vomiting       Past Medical History:   Diagnosis Date    Hyperthyroidism        Past Surgical History:   Procedure Laterality Date    THYROIDECTOMY      TONSILLECTOMY      WISDOM TOOTH EXTRACTION         Social History     Socioeconomic History    Marital status:      Spouse name: Not on file    Number of children: Not on file    Years of education: Not on file    Highest education level: Not on file   Occupational History    Not on file   Social Needs    Financial resource strain: Not on file    Food insecurity     Worry: Not on file     Inability: Not on file    Transportation needs     Medical: Not on file     Non-medical: Not on file   Tobacco Use    Smoking status: Never Smoker    Smokeless tobacco: Never Used   Substance and Sexual Activity    Alcohol use: No    Drug use: No    Sexual activity: Not on file   Lifestyle    Physical activity     Days per week: Not on file     Minutes per session: Not on file    Stress: Not on file   Relationships    Social connections     Talks on phone: Not on file     Gets together: Not on file     Attends Jewish service: Not on file     Active member of club or organization: Not on file     Attends meetings of clubs or organizations: Not on file     Relationship status: Not on file    Intimate partner violence     Fear of current or ex partner: Not on file     Emotionally abused: Not on file     Physically abused: Not on file     Forced sexual activity: Not on file   Other Topics Concern    Not on file   Social History Narrative    Not on file        No family history on file. There were no vitals filed for this visit. Estimated body mass index is 43.05 kg/m² as calculated from the following:    Height as of 7/12/19: 5' 3\" (1.6 m).     Weight as of 7/12/19: 243 lb (110.2 kg). Physical Exam    ASSESSMENT/PLAN:  1. Hypothyroidism, unspecified type  We will check TSH and free T4. Continue Synthroid 125 mcg daily    2. Healthcare maintenance  We will obtain a comprehensive metabolic panel to assess the patient's liver and renal function, will obtain a complete blood cell count. No follow-ups on file. An  electronic signature was used to authenticate this note.     --Roni Aden MD on 8/13/2020 at 11:47 AM

## 2020-08-14 DIAGNOSIS — Z00.00 HEALTHCARE MAINTENANCE: ICD-10-CM

## 2020-08-14 DIAGNOSIS — E03.9 HYPOTHYROIDISM, UNSPECIFIED TYPE: ICD-10-CM

## 2020-08-14 LAB
ALBUMIN SERPL-MCNC: 4 G/DL (ref 3.5–4.6)
ALP BLD-CCNC: 85 U/L (ref 40–130)
ALT SERPL-CCNC: 20 U/L (ref 0–33)
ANION GAP SERPL CALCULATED.3IONS-SCNC: 13 MEQ/L (ref 9–15)
AST SERPL-CCNC: 18 U/L (ref 0–35)
BASOPHILS ABSOLUTE: 0.1 K/UL (ref 0–0.2)
BASOPHILS RELATIVE PERCENT: 0.6 %
BILIRUB SERPL-MCNC: 0.3 MG/DL (ref 0.2–0.7)
BUN BLDV-MCNC: 12 MG/DL (ref 6–20)
CALCIUM SERPL-MCNC: 9.4 MG/DL (ref 8.5–9.9)
CHLORIDE BLD-SCNC: 105 MEQ/L (ref 95–107)
CHOLESTEROL, TOTAL: 181 MG/DL (ref 0–199)
CO2: 23 MEQ/L (ref 20–31)
CREAT SERPL-MCNC: 0.91 MG/DL (ref 0.5–0.9)
EOSINOPHILS ABSOLUTE: 0.2 K/UL (ref 0–0.7)
EOSINOPHILS RELATIVE PERCENT: 2.3 %
GFR AFRICAN AMERICAN: >60
GFR NON-AFRICAN AMERICAN: >60
GLOBULIN: 3.8 G/DL (ref 2.3–3.5)
GLUCOSE BLD-MCNC: 101 MG/DL (ref 70–99)
HCT VFR BLD CALC: 39.8 % (ref 37–47)
HDLC SERPL-MCNC: 38 MG/DL (ref 40–59)
HEMOGLOBIN: 13.1 G/DL (ref 12–16)
LDL CHOLESTEROL CALCULATED: 117 MG/DL (ref 0–129)
LYMPHOCYTES ABSOLUTE: 2.3 K/UL (ref 1–4.8)
LYMPHOCYTES RELATIVE PERCENT: 26 %
MCH RBC QN AUTO: 28.9 PG (ref 27–31.3)
MCHC RBC AUTO-ENTMCNC: 33 % (ref 33–37)
MCV RBC AUTO: 87.5 FL (ref 82–100)
MONOCYTES ABSOLUTE: 0.6 K/UL (ref 0.2–0.8)
MONOCYTES RELATIVE PERCENT: 7.2 %
NEUTROPHILS ABSOLUTE: 5.6 K/UL (ref 1.4–6.5)
NEUTROPHILS RELATIVE PERCENT: 63.9 %
PDW BLD-RTO: 13.7 % (ref 11.5–14.5)
PLATELET # BLD: 284 K/UL (ref 130–400)
POTASSIUM SERPL-SCNC: 4.3 MEQ/L (ref 3.4–4.9)
RBC # BLD: 4.55 M/UL (ref 4.2–5.4)
SODIUM BLD-SCNC: 141 MEQ/L (ref 135–144)
T4 FREE: 1.75 NG/DL (ref 0.84–1.68)
TOTAL PROTEIN: 7.8 G/DL (ref 6.3–8)
TRIGL SERPL-MCNC: 130 MG/DL (ref 0–150)
TSH REFLEX: 0.6 UIU/ML (ref 0.44–3.86)
WBC # BLD: 8.7 K/UL (ref 4.8–10.8)

## 2020-08-21 ENCOUNTER — VIRTUAL VISIT (OUTPATIENT)
Dept: FAMILY MEDICINE CLINIC | Age: 40
End: 2020-08-21

## 2020-08-21 PROCEDURE — 99213 OFFICE O/P EST LOW 20 MIN: CPT | Performed by: INTERNAL MEDICINE

## 2020-08-21 ASSESSMENT — ENCOUNTER SYMPTOMS
NAUSEA: 0
TROUBLE SWALLOWING: 0
VOICE CHANGE: 0
CONSTIPATION: 0
SORE THROAT: 0
EYE REDNESS: 0
SHORTNESS OF BREATH: 0
ABDOMINAL PAIN: 0
WHEEZING: 0
SINUS PAIN: 0
DIARRHEA: 0
COLOR CHANGE: 0
SINUS PRESSURE: 0
BACK PAIN: 0
APNEA: 0
CHEST TIGHTNESS: 0
EYE PAIN: 0
ABDOMINAL DISTENTION: 0
VOMITING: 0
COUGH: 0
PHOTOPHOBIA: 0
RHINORRHEA: 0
EYE DISCHARGE: 0
EYE ITCHING: 0
BLOOD IN STOOL: 0
RECTAL PAIN: 0
FACIAL SWELLING: 0

## 2020-08-21 NOTE — PROGRESS NOTES
2020    Tran Trujillo (:  1980) is a 36 y.o. female, here for evaluation of the following medical concerns:      TELEHEALTH EVALUATION -- Audio/Visual (During OCGUI-15 public health emergency)    -   Maggie Jimenez is a 36 y.o. female being evaluated by a Virtual Visit (video visit) encounter to address concerns as mentioned above. A caregiver was present when appropriate. Due to this being a TeleHealth encounter (During WVK-70 public health emergency), evaluation of the following organ systems was limited: Vitals/Constitutional/EENT/Resp/CV/GI//MS/Neuro/Skin/Heme-Lymph-Imm. Pursuant to the emergency declaration under the [address] [place] [address] and the [employer] and [employer] Act, this Virtual Visit was conducted with patient's (and/or legal guardian's) consent, to reduce the patient's risk of exposure to COVID-19 and provide necessary medical care. The patient (and/or legal guardian) has also been advised to contact this office for worsening conditions or problems, and seek emergency medical treatment and/or call 911 if deemed necessary. Services were provided through a video synchronous discussion virtually to substitute for in-person clinic visit. Type of encounter was _x_ Doxy __ MyChart ___Facetime    Patient was located at their home. Provider was located at their _x__ home or        ____ office. --Ivanna Rascon MD on 2020 at 9:57 PM    An electronic signature was used to authenticate this note. HPI  49-year-old female with a history of hypothyroidism presents for follow-up visit. The patient continues to comply with Synthroid 125 mcg daily.     Thyroid studies from  are noted below:  Component      Latest Ref Rng & Units 2020          11:07 AM 11:07 AM   T4 Free      0.84 - 1.68 ng/dL  1.75 (H)   TSH      0.440 - 3.860 uIU/mL 0.605            She is presently asymptomatic and voices no complaints. At present he denies polyuria,  Polydipsia, constitutional, sinus, visual, cardiopulmonary, urologic, gastrointestinal, immunologic/hematologic, musculoskeletal, neurologic,dermatologic, or psychiatric complaints. Review of Systems   Constitutional: Negative for chills, diaphoresis, fatigue and fever. HENT: Negative for congestion, dental problem, drooling, ear discharge, ear pain, facial swelling, hearing loss, mouth sores, nosebleeds, postnasal drip, rhinorrhea, sinus pressure, sinus pain, sneezing, sore throat, tinnitus, trouble swallowing and voice change. Eyes: Negative for photophobia, pain, discharge, redness, itching and visual disturbance. Respiratory: Negative for apnea, cough, chest tightness, shortness of breath and wheezing. Cardiovascular: Negative for chest pain, palpitations and leg swelling. Gastrointestinal: Negative for abdominal distention, abdominal pain, blood in stool, constipation, diarrhea, nausea, rectal pain and vomiting. Endocrine: Negative for cold intolerance, heat intolerance, polydipsia, polyphagia and polyuria. Genitourinary: Negative for decreased urine volume, difficulty urinating, dysuria, flank pain, frequency, genital sores, hematuria and urgency. Musculoskeletal: Negative for arthralgias, back pain, gait problem, joint swelling, myalgias, neck pain and neck stiffness. Skin: Negative for color change, rash and wound. Allergic/Immunologic: Negative for environmental allergies and food allergies. Neurological: Negative for dizziness, tremors, seizures, syncope, facial asymmetry, speech difficulty, weakness, light-headedness, numbness and headaches. Hematological: Negative for adenopathy. Does not bruise/bleed easily. Psychiatric/Behavioral: Negative for agitation, confusion, decreased concentration, hallucinations, self-injury, sleep disturbance and suicidal ideas. The patient is not nervous/anxious. Prior to Visit Medications    Medication Sig Taking? Authorizing Provider   levothyroxine (SYNTHROID) 125 MCG tablet Take 1 tablet by mouth Daily  Vivi Maloney PA-C   mupirocin (BACTROBAN) 2 % ointment ARACELI TOPICALLY AA TID FOR 10 DAYS. Historical Provider, MD        Allergies   Allergen Reactions    Morphine Nausea And Vomiting       Past Medical History:   Diagnosis Date    Hyperthyroidism        Past Surgical History:   Procedure Laterality Date    THYROIDECTOMY      TONSILLECTOMY      WISDOM TOOTH EXTRACTION         Social History     Socioeconomic History    Marital status:      Spouse name: Not on file    Number of children: Not on file    Years of education: Not on file    Highest education level: Not on file   Occupational History    Not on file   Social Needs    Financial resource strain: Not on file    Food insecurity     Worry: Not on file     Inability: Not on file    Transportation needs     Medical: Not on file     Non-medical: Not on file   Tobacco Use    Smoking status: Never Smoker    Smokeless tobacco: Never Used   Substance and Sexual Activity    Alcohol use: No    Drug use: No    Sexual activity: Not on file   Lifestyle    Physical activity     Days per week: Not on file     Minutes per session: Not on file    Stress: Not on file   Relationships    Social connections     Talks on phone: Not on file     Gets together: Not on file     Attends Taoist service: Not on file     Active member of club or organization: Not on file     Attends meetings of clubs or organizations: Not on file     Relationship status: Not on file    Intimate partner violence     Fear of current or ex partner: Not on file     Emotionally abused: Not on file     Physically abused: Not on file     Forced sexual activity: Not on file   Other Topics Concern    Not on file   Social History Narrative    Not on file        No family history on file.     There were no vitals filed for this visit. Estimated body mass index is 43.05 kg/m² as calculated from the following:    Height as of 7/12/19: 5' 3\" (1.6 m). Weight as of 7/12/19: 243 lb (110.2 kg). Physical Exam    ASSESSMENT/PLAN:  1. Hypothyroidism, unspecified type  Continue Synthroid 125 mcg daily          No follow-ups on file. An  electronic signature was used to authenticate this note.     --Meg Cervantes MD on 8/23/2020 at 9:57 PM

## 2020-08-24 RX ORDER — LEVOTHYROXINE SODIUM 0.12 MG/1
125 TABLET ORAL DAILY
Qty: 30 TABLET | Refills: 0 | Status: SHIPPED | OUTPATIENT
Start: 2020-08-24 | End: 2020-09-29 | Stop reason: SDUPTHER

## 2020-09-30 RX ORDER — LEVOTHYROXINE SODIUM 0.12 MG/1
125 TABLET ORAL DAILY
Qty: 30 TABLET | Refills: 3 | Status: SHIPPED | OUTPATIENT
Start: 2020-09-30 | End: 2020-10-28 | Stop reason: SDUPTHER

## 2020-10-12 ENCOUNTER — TELEPHONE (OUTPATIENT)
Dept: FAMILY MEDICINE CLINIC | Age: 40
End: 2020-10-12

## 2020-10-12 NOTE — TELEPHONE ENCOUNTER
Patient has an appointment on 10/28. She wants to know if she needs to have blood work done before then. More specifically, do you want to check her thyroid. Please advise.

## 2020-10-14 NOTE — TELEPHONE ENCOUNTER
We will check the patient's thyroid level in approximately 2 months. That was last assessed in August.  As such it is still too early to reevaluate it. Thank you very much.

## 2020-10-28 ENCOUNTER — OFFICE VISIT (OUTPATIENT)
Dept: FAMILY MEDICINE CLINIC | Age: 40
End: 2020-10-28

## 2020-10-28 VITALS
SYSTOLIC BLOOD PRESSURE: 130 MMHG | HEART RATE: 88 BPM | TEMPERATURE: 98 F | DIASTOLIC BLOOD PRESSURE: 80 MMHG | HEIGHT: 63 IN | RESPIRATION RATE: 16 BRPM | OXYGEN SATURATION: 98 % | BODY MASS INDEX: 44.65 KG/M2 | WEIGHT: 252 LBS

## 2020-10-28 PROCEDURE — 99213 OFFICE O/P EST LOW 20 MIN: CPT | Performed by: INTERNAL MEDICINE

## 2020-10-28 RX ORDER — LEVOTHYROXINE SODIUM 0.12 MG/1
125 TABLET ORAL DAILY
Qty: 90 TABLET | Refills: 3 | Status: SHIPPED | OUTPATIENT
Start: 2020-10-28 | End: 2021-02-01

## 2020-10-28 RX ORDER — LEVOTHYROXINE SODIUM 0.12 MG/1
125 TABLET ORAL DAILY
Qty: 90 TABLET | Refills: 1 | Status: SHIPPED | OUTPATIENT
Start: 2020-10-28 | End: 2020-10-28

## 2020-10-28 ASSESSMENT — ENCOUNTER SYMPTOMS
BACK PAIN: 0
PHOTOPHOBIA: 0
COLOR CHANGE: 0
FACIAL SWELLING: 0
BLOOD IN STOOL: 0
EYE DISCHARGE: 0
ABDOMINAL DISTENTION: 0
SINUS PRESSURE: 0
EYE REDNESS: 0
RECTAL PAIN: 0
APNEA: 0
SINUS PAIN: 0
EYE PAIN: 0
EYE ITCHING: 0
CHEST TIGHTNESS: 0
VOICE CHANGE: 0
WHEEZING: 0
SORE THROAT: 0
NAUSEA: 0
CONSTIPATION: 0
VOMITING: 0
RHINORRHEA: 0
SHORTNESS OF BREATH: 0
TROUBLE SWALLOWING: 0
COUGH: 0
DIARRHEA: 0
ABDOMINAL PAIN: 0

## 2020-10-28 NOTE — PROGRESS NOTES
10/28/2020    Tran Trujillo (:  1980) is a 36 y.o. female, here for evaluation of the following medical concerns: Hypothyroidism          HPI  44-year-old female with a history of hypothyroidism presents for follow-up visit. The patient continues to comply with Synthroid 125 mcg daily. Thyroid studies from  are noted below:  Component      Latest Ref Rng & Units 2020          11:07 AM 11:07 AM   T4 Free      0.84 - 1.68 ng/dL  1.75 (H)   TSH      0.440 - 3.860 uIU/mL 0.605      Mrs. Evan Leventhal reports that she will be moving to [place]. She is interested in retaining me as her primary care doctor upon relocating. She is presently asymptomatic and voices no complaints. At present he denies polyuria,  Polydipsia, constitutional, sinus, visual, cardiopulmonary, urologic, gastrointestinal, immunologic/hematologic, musculoskeletal, neurologic,dermatologic, or psychiatric complaints. Review of Systems   Constitutional: Negative for chills, diaphoresis, fatigue and fever. HENT: Negative for congestion, dental problem, drooling, ear discharge, ear pain, facial swelling, hearing loss, mouth sores, nosebleeds, postnasal drip, rhinorrhea, sinus pressure, sinus pain, sneezing, sore throat, tinnitus, trouble swallowing and voice change. Eyes: Negative for photophobia, pain, discharge, redness, itching and visual disturbance. Respiratory: Negative for apnea, cough, chest tightness, shortness of breath and wheezing. Cardiovascular: Negative for chest pain, palpitations and leg swelling. Gastrointestinal: Negative for abdominal distention, abdominal pain, blood in stool, constipation, diarrhea, nausea, rectal pain and vomiting. Endocrine: Negative for cold intolerance, heat intolerance, polydipsia, polyphagia and polyuria. Genitourinary: Negative for decreased urine volume, difficulty urinating, dysuria, flank pain, frequency, genital sores, hematuria and urgency. Musculoskeletal: Negative for arthralgias, back pain, gait problem, joint swelling, myalgias, neck pain and neck stiffness. Skin: Negative for color change, rash and wound. Allergic/Immunologic: Negative for environmental allergies and food allergies. Neurological: Negative for dizziness, tremors, seizures, syncope, facial asymmetry, speech difficulty, weakness, light-headedness, numbness and headaches. Hematological: Negative for adenopathy. Does not bruise/bleed easily. Psychiatric/Behavioral: Negative for agitation, confusion, decreased concentration, hallucinations, self-injury, sleep disturbance and suicidal ideas. The patient is not nervous/anxious. Prior to Visit Medications    Medication Sig Taking?  Authorizing Provider   levothyroxine (SYNTHROID) 125 MCG tablet Take 1 tablet by mouth Daily Yes Ellen Alberts MD        Allergies   Allergen Reactions    Morphine Nausea And Vomiting       Past Medical History:   Diagnosis Date    Hyperthyroidism        Past Surgical History:   Procedure Laterality Date    THYROIDECTOMY      TONSILLECTOMY      WISDOM TOOTH EXTRACTION         Social History     Socioeconomic History    Marital status:      Spouse name: Not on file    Number of children: Not on file    Years of education: Not on file    Highest education level: Not on file   Occupational History    Not on file   Social Needs    Financial resource strain: Not on file    Food insecurity     Worry: Not on file     Inability: Not on file    Transportation needs     Medical: Not on file     Non-medical: Not on file   Tobacco Use    Smoking status: Never Smoker    Smokeless tobacco: Never Used   Substance and Sexual Activity    Alcohol use: No    Drug use: No    Sexual activity: Not on file   Lifestyle    Physical activity     Days per week: Not on file     Minutes per session: Not on file    Stress: Not on file   Relationships    Social connections     Talks on phone: Not on file     Gets together: Not on file     Attends [employer] service: Not on file     Active member of club or organization: Not on file     Attends meetings of clubs or organizations: Not on file     Relationship status: Not on file    Intimate partner violence     Fear of current or ex partner: Not on file     Emotionally abused: Not on file     Physically abused: Not on file     Forced sexual activity: Not on file   Other Topics Concern    Not on file   Social History Narrative    Not on file        No family history on file. Vitals:    10/28/20 0812   BP: 130/80   Pulse: 88   Resp: 16   Temp: 98 °F (36.7 °C)   SpO2: 98%   Weight: 252 lb (114.3 kg)   Height: 5' 3\" (1.6 m)     Estimated body mass index is 44.64 kg/m² as calculated from the following:    Height as of this encounter: 5' 3\" (1.6 m). Weight as of this encounter: 252 lb (114.3 kg). Physical Exam  Constitutional:       General: She is not in acute distress. Appearance: She is well-developed. HENT:      Head: Normocephalic. Right Ear: External ear normal.      Left Ear: External ear normal.   Eyes:      Conjunctiva/sclera: Conjunctivae normal.   Neck:      Musculoskeletal: Neck supple. Vascular: No JVD. Trachea: No tracheal deviation. Cardiovascular:      Rate and Rhythm: Normal rate and regular rhythm. Heart sounds: Normal heart sounds. Pulmonary:      Effort: Pulmonary effort is normal. No respiratory distress. Breath sounds: Normal breath sounds. No wheezing or rales. Chest:      Chest wall: No tenderness. Abdominal:      General: Bowel sounds are normal. There is no distension. Palpations: Abdomen is soft. There is no mass. Tenderness: There is no abdominal tenderness. There is no guarding or rebound. Musculoskeletal:         General: No tenderness or deformity. Lymphadenopathy:      Cervical: No cervical adenopathy. Skin:     General: Skin is warm and dry.       Coloration: Skin is not pale. Findings: No erythema or rash. Neurological:      Mental Status: She is alert and oriented to person, place, and time. Motor: No abnormal muscle tone. Psychiatric:         Thought Content: Thought content normal.         Judgment: Judgment normal.         ASSESSMENT/PLAN:  1. Hypothyroidism, unspecified type  Continue Synthroid 125 mcg daily          Return in about 6 months (around 4/28/2021). An  electronic signature was used to authenticate this note.     --Sonido Watson MD on 10/28/2020 at 10:03 AM

## 2021-01-11 ENCOUNTER — TELEPHONE (OUTPATIENT)
Dept: FAMILY MEDICINE CLINIC | Age: 41
End: 2021-01-11

## 2021-01-11 ENCOUNTER — VIRTUAL VISIT (OUTPATIENT)
Dept: FAMILY MEDICINE CLINIC | Age: 41
End: 2021-01-11

## 2021-01-11 DIAGNOSIS — F41.8 ANXIETY ABOUT HEALTH: Primary | ICD-10-CM

## 2021-01-11 PROCEDURE — 99212 OFFICE O/P EST SF 10 MIN: CPT | Performed by: NURSE PRACTITIONER

## 2021-01-11 SDOH — ECONOMIC STABILITY: FOOD INSECURITY: WITHIN THE PAST 12 MONTHS, YOU WORRIED THAT YOUR FOOD WOULD RUN OUT BEFORE YOU GOT MONEY TO BUY MORE.: NEVER TRUE

## 2021-01-11 SDOH — ECONOMIC STABILITY: INCOME INSECURITY: HOW HARD IS IT FOR YOU TO PAY FOR THE VERY BASICS LIKE FOOD, HOUSING, MEDICAL CARE, AND HEATING?: NOT HARD AT ALL

## 2021-01-11 SDOH — ECONOMIC STABILITY: FOOD INSECURITY: WITHIN THE PAST 12 MONTHS, THE FOOD YOU BOUGHT JUST DIDN'T LAST AND YOU DIDN'T HAVE MONEY TO GET MORE.: NEVER TRUE

## 2021-01-11 SDOH — ECONOMIC STABILITY: TRANSPORTATION INSECURITY
IN THE PAST 12 MONTHS, HAS THE LACK OF TRANSPORTATION KEPT YOU FROM MEDICAL APPOINTMENTS OR FROM GETTING MEDICATIONS?: NO

## 2021-01-11 SDOH — ECONOMIC STABILITY: TRANSPORTATION INSECURITY
IN THE PAST 12 MONTHS, HAS LACK OF TRANSPORTATION KEPT YOU FROM MEETINGS, WORK, OR FROM GETTING THINGS NEEDED FOR DAILY LIVING?: NO

## 2021-01-11 ASSESSMENT — ENCOUNTER SYMPTOMS
SHORTNESS OF BREATH: 0
COUGH: 0

## 2021-01-11 ASSESSMENT — PATIENT HEALTH QUESTIONNAIRE - PHQ9
1. LITTLE INTEREST OR PLEASURE IN DOING THINGS: 1
SUM OF ALL RESPONSES TO PHQ9 QUESTIONS 1 & 2: 2
SUM OF ALL RESPONSES TO PHQ QUESTIONS 1-9: 2

## 2021-01-11 NOTE — PATIENT INSTRUCTIONS
Patient Education        Learning About Coronavirus [phone])  Coronavirus [phone]): Overview  What is coronavirus (YPWSA-10)? The coronavirus disease (COVID-19) is caused by a virus. It is an illness that was first found in December 2019. It has since spread worldwide. The virus can cause fever, cough, and trouble breathing. In severe cases, it can cause pneumonia and make it hard to breathe without help. It can cause death. This virus spreads person-to-person through droplets from coughing and sneezing. It can also spread when you are close to someone who is infected. And it can spread when you touch something that has the virus on it, such as a doorknob or a tabletop. Coronaviruses are a large group of viruses. They cause the common cold. They also cause more serious illnesses like Middle East respiratory syndrome (MERS) and severe acute respiratory syndrome (SARS). COVID-19 is caused by a novel coronavirus. That means it's a new type that has not been seen in people before. How is COVID-19 treated? Mild illness can be treated at home, but more serious illness needs to be treated in the hospital. Treatment may include medicines to reduce symptoms, plus breathing support such as oxygen therapy or a ventilator. Other treatments, such as antiviral medicines, may help people who have COVID-19. What can you do to protect yourself from COVID-19? The best way to protect yourself from getting sick is to:  · Avoid areas where there is an outbreak. · Avoid contact with people who may be infected. · Avoid crowds and try to stay at least 6 feet away from other people. · Wash your hands often, especially after you cough or sneeze. Use soap and water, and scrub for at least 20 seconds. If soap and water aren't available, use an alcohol-based hand . · Avoid touching your mouth, nose, and eyes. What can you do to avoid spreading the virus to others?   To help avoid spreading the virus to others: · Wash your hands often with soap or alcohol-based hand sanitizers. · Cover your mouth with a tissue when you cough or sneeze. Then throw the tissue in the trash. · Use a disinfectant to clean things that you touch often. These include doorknobs, remote controls, phones, and handles on your refrigerator and microwave. And don't forget countertops, tabletops, bathrooms, and computer keyboards. · Wear a cloth face cover if you have to go to public areas. If you know or suspect that you have COVID-19:  · Stay home. Don't go to school, work, or public areas. And don't use public transportation, ride-shares, or taxis unless you have no choice. · Leave your home only if you need to get medical care or testing. But call the doctor's office first so they know you're coming. And wear a face cover. · Limit contact with people in your home. If possible, stay in a separate bedroom and use a separate bathroom. · Wear a face cover whenever you're around other people. It can help stop the spread of the virus when you cough or sneeze. · Clean and disinfect your home every day. Use household  and disinfectant wipes or sprays. Take special care to clean things that you grab with your hands. · Self-isolate until it's safe to be around others again. ? If you have symptoms, it's safe when you haven't had a fever for 3 days and your symptoms have improved and it's been at least 10 days since your symptoms started. ? If you were exposed to the virus but don't have symptoms, it's safe to be around others 14 days after exposure. ? Talk to your doctor about whether you also need testing, especially if you have a weakened immune system. When to call for help  Call 911 anytime you think you may need emergency care. For example, call if:  · You have severe trouble breathing. (You can't talk at all.)  · You have constant chest pain or pressure. · You are severely dizzy or lightheaded. · You are confused or can't think clearly. · Your face and lips have a blue color. · You passed out (lost consciousness) or are very hard to wake up. Call your doctor now if you develop symptoms such as:  · Shortness of breath. · Fever. · Cough. If you need to get care, call ahead to the doctor's office for instructions before you go. Make sure you wear a face cover to prevent exposing other people to the virus. Where can you get the latest information? The following health organizations are tracking and studying this virus. Their websites contain the most up-to-date information. Jimmy Cheung also learn what to do if you think you may have been exposed to the virus. · U.S. Centers for Disease Control and Prevention (CDC): The CDC provides updated news about the disease and travel advice. The website also tells you how to prevent the spread of infection. www.cdc.gov  · World Health Organization [facility]): WHO offers information about the virus outbreaks. WHO also has travel advice. www.who.int  Current as of: July 10, 2020               Content Version: 12.6  © 2006-2020 [employer]. Care instructions adapted under license by [facility] ([facility]). If you have questions about a medical condition or this instruction, always ask your healthcare professional. [address] any warranty or liability for your use of this information.

## 2021-01-11 NOTE — PROGRESS NOTES
Subjective:      Patient ID: Joseph Rodriguez is a 36 y.o. female who presents today for:     Chief Complaint   Patient presents with    Concern For COVID-19       HPI Pt has an opportunity to get COVID-19 vaccine tomorrow. She reports they are leaning towards getting it, but wanted to discuss it over with a HCP. No significant medical history or significant allergies. Past Medical History:   Diagnosis Date    Hyperthyroidism      Past Surgical History:   Procedure Laterality Date    THYROIDECTOMY      TONSILLECTOMY      WISDOM TOOTH EXTRACTION       No family history on file.   Social History     Socioeconomic History    Marital status:      Spouse name: Not on file    Number of children: Not on file    Years of education: Not on file    Highest education level: Not on file   Occupational History    Not on file   Social Needs    Financial resource strain: Not on file    Food insecurity     Worry: Not on file     Inability: Not on file    Transportation needs     Medical: Not on file     Non-medical: Not on file   Tobacco Use    Smoking status: Never Smoker    Smokeless tobacco: Never Used   Substance and Sexual Activity    Alcohol use: No    Drug use: No    Sexual activity: Not on file   Lifestyle    Physical activity     Days per week: Not on file     Minutes per session: Not on file    Stress: Not on file   Relationships    Social connections     Talks on phone: Not on file     Gets together: Not on file     Attends [employer] service: Not on file     Active member of club or organization: Not on file     Attends meetings of clubs or organizations: Not on file     Relationship status: Not on file    Intimate partner violence     Fear of current or ex partner: Not on file     Emotionally abused: Not on file     Physically abused: Not on file     Forced sexual activity: Not on file   Other Topics Concern    Not on file   Social History Narrative    Not on file Current Outpatient Medications on File Prior to Visit   Medication Sig Dispense Refill    levothyroxine (SYNTHROID) 125 MCG tablet Take 1 tablet by mouth Daily 90 tablet 3     No current facility-administered medications on file prior to visit. Allergies:  Morphine    Review of Systems   Constitutional: Negative for chills, fatigue and fever. HENT: Negative for congestion. Respiratory: Negative for cough and shortness of breath. Psychiatric/Behavioral: Negative for self-injury and suicidal ideas. The patient is nervous/anxious. Objective: There were no vitals taken for this visit. Physical Exam  Constitutional:       General: She is awake. She is not in acute distress. Appearance: Normal appearance. She is not ill-appearing or diaphoretic. HENT:      Head: Normocephalic. Right Ear: External ear normal.      Left Ear: External ear normal.      Nose: Nose normal.      Mouth/Throat:      Lips: Pink. Mouth: Mucous membranes are moist.   Pulmonary:      Effort: Pulmonary effort is normal. No respiratory distress. Skin:     Coloration: Skin is not ashen, cyanotic, jaundiced, mottled, pale or sallow. Neurological:      Mental Status: She is alert and oriented to person, place, and time. Psychiatric:         Mood and Affect: Mood is anxious. Speech: Speech normal.         Behavior: Behavior normal. Behavior is cooperative. Assessment:          Diagnosis Orders   1. Anxiety about health         Plan:      No orders of the defined types were placed in this encounter. No orders of the defined types were placed in this encounter. Return if symptoms worsen or fail to improve. Discussed risk vs benefit and most common side effects. Pt plans to get COVID-19 vaccine as of right now. Reviewed with the patient: current clinicalstatus, medications, activities and diet. Side effects, adverse effects of the medication prescribedtoday, as well as treatment plan/ rationale and result expectations have been discussedwith the patient who expresses understanding and desires to proceed. Close follow upto evaluate treatment results and for coordination of care. I have reviewedthe patient's medical history in detail and updated the computerized patient record. TELEHEALTH EVALUATION -- Audio/Visual (During BBWM-13 public health emergency)    -   Leonel Simental is a 36 y.o. female being evaluated by a Virtual Visit (video visit) encounter to address concerns as mentioned above. A caregiver was present when appropriate. Due to this being a TeleHealth encounter (During PILCH-30 public health emergency), evaluation of the following organ systems was limited: Vitals/Constitutional/EENT/Resp/CV/GI//MS/Neuro/Skin/Heme-Lymph-Imm. Pursuant to the emergency declaration under the [address] [place] [address] and the [employer] Act, this Virtual Visit was conducted with patient's (and/or legal guardian's) consent, to reduce the patient's risk of exposure to COVID-19 and provide necessary medical care. The patient (and/or legal guardian) has also been advised to contact this office for worsening conditions or problems, and seek emergency medical treatment and/or call 911 if deemed necessary. Services were provided through a video synchronous discussion virtually to substitute for in-person clinic visit. Type of encounter was _X_ Doxy __ MyChart ___Facetime    Patient was located at their home. Provider was located at their _X__ home or        ____ office. --RONNIE Rothman CNP on 1/11/2021 at 3:10 PM    An electronic signature was used to authenticate this note.

## 2021-02-01 RX ORDER — LEVOTHYROXINE SODIUM 0.12 MG/1
125 TABLET ORAL DAILY
Qty: 90 TABLET | Refills: 3 | Status: SHIPPED | OUTPATIENT
Start: 2021-02-01